# Patient Record
Sex: FEMALE | Race: WHITE | NOT HISPANIC OR LATINO
[De-identification: names, ages, dates, MRNs, and addresses within clinical notes are randomized per-mention and may not be internally consistent; named-entity substitution may affect disease eponyms.]

---
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
## 2022-01-03 NOTE — HISTORY OF PRESENT ILLNESS
[FreeTextEntry1] : This is the first outpatient cardiac visit for Ms. Olivera.\par \par 20 yo woman \par \par h/o anxiety / depression\par lipids 2020: tchol 203, trig 80, HDL 56,  mg/dL\par A1c 2020: 5.2%

## 2022-01-06 ENCOUNTER — APPOINTMENT (OUTPATIENT)
Dept: CARDIOLOGY | Facility: CLINIC | Age: 22
End: 2022-01-06

## 2022-01-17 ENCOUNTER — TRANSCRIPTION ENCOUNTER (OUTPATIENT)
Age: 22
End: 2022-01-17

## 2023-02-22 ENCOUNTER — EMERGENCY (EMERGENCY)
Facility: HOSPITAL | Age: 23
LOS: 1 days | Discharge: HOME | End: 2023-02-22
Attending: EMERGENCY MEDICINE | Admitting: EMERGENCY MEDICINE
Payer: COMMERCIAL

## 2023-02-22 VITALS
OXYGEN SATURATION: 100 % | SYSTOLIC BLOOD PRESSURE: 119 MMHG | DIASTOLIC BLOOD PRESSURE: 75 MMHG | RESPIRATION RATE: 16 BRPM | HEART RATE: 110 BPM | TEMPERATURE: 98 F

## 2023-02-22 PROCEDURE — 99284 EMERGENCY DEPT VISIT MOD MDM: CPT

## 2023-02-22 NOTE — ED ADULT NURSE NOTE - OBJECTIVE STATEMENT
Break RN: Pt presents to the ED via EMS from [facility] for SI without a plan. Pt says she feels stressed with schoolwork and is having a difficult time with friends. Pt has a Hx of depression and takes medication at home. Pt admits to cutting her left FA and RU thigh out of frustration. Pt denies homicidal ideation, denies auditory or visual hallucinations. Pt states she feels anxious. Pt denies any pain, no pertinent medical issues. Break RN: Pt presents to the ED via EMS from [facility] for SI without a plan. Pt says she feels stressed with schoolwork and is having a difficult time with friends. Pt has a Hx of depression and takes medication at home. Pt admits to cutting her left FA and RU thigh out of frustration last week. Pt denies homicidal ideation, denies auditory or visual hallucinations. Pt states she feels anxious. Pt denies any pain, no pertinent medical issues.

## 2023-02-22 NOTE — ED ADULT TRIAGE NOTE - CHIEF COMPLAINT QUOTE
Pt c/o inc depression and SI since yesterday. pt is calm and cooperative and is a student at  [employer]. Pt states she has been having inc stress from school and having issues with friends. pt denies any active plan. pt has PMH of depression on zoloft and compliant with medications. denies any medical complaints. Pt denies HI.  Pt denies visual or auditory hallucinations or other complaints. No complaints of chest pain, headache, nausea, dizziness, vomiting  SOB, fever, chills verbalized..

## 2023-02-23 VITALS
OXYGEN SATURATION: 100 % | HEART RATE: 96 BPM | RESPIRATION RATE: 16 BRPM | TEMPERATURE: 99 F | SYSTOLIC BLOOD PRESSURE: 116 MMHG | DIASTOLIC BLOOD PRESSURE: 76 MMHG

## 2023-02-23 DIAGNOSIS — F33.1 MAJOR DEPRESSIVE DISORDER, RECURRENT, MODERATE: ICD-10-CM

## 2023-02-23 NOTE — ED BEHAVIORAL HEALTH NOTE - BEHAVIORAL HEALTH NOTE
A  contacted  [employer] at [phone] to arrange transportation. SW spoke to Mr. Kevin, who stated that he would arrange a taxi for the student to return to [place]. SW will receive a call when the taxi is in front of the ER.

## 2023-02-23 NOTE — ED PROVIDER NOTE - OBJECTIVE STATEMENT
This is a 22-year-old female past medical history of anxiety and depression with complaint of increased depression and suicidal ideations. 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 Reports stressors as supposed to graduate this semester, but unfortunately she will not be able to do so because she is missing couple of credits. Tonight she called her friend and expressed she does not see any other way out ,she is so disappointed, and just wants to hurt herself. Endorses engaged in self-injurious behaviors about 2 days ago some healed scab, superficial linear cuts to the left forearm and one linear superficial healed cut to right upper thigh, used a razor, utd with tetanus.  She endorses she was frustrated and wanted to have some relief, she did not wanted to die.

## 2023-02-23 NOTE — ED PROVIDER NOTE - PATIENT PORTAL LINK FT
You can access the FollowMyHealth Patient Portal offered by [facility] by registering at the following website: [email or web address]. By joining [facility]’s FollowMyHealth portal, you will also be able to view your health information using other applications (apps) compatible with our system.

## 2023-02-23 NOTE — ED BEHAVIORAL HEALTH ASSESSMENT NOTE - DESCRIPTION
In the ED, patient has been calm, cooperative, and in good behavioral control. None Domiciled at  [employer] in dorms with 2 roommates

## 2023-02-23 NOTE — ED BEHAVIORAL HEALTH ASSESSMENT NOTE - RISK ASSESSMENT
Patient is a low acute risk of harm to self or others.  Risk factors: MDD, social stressors, NSSIB  Protective factors: in outpatient tx, sobriety, social supports

## 2023-02-23 NOTE — ED ADULT NURSE REASSESSMENT NOTE - NS ED NURSE REASSESS COMMENT FT1
Pt denies any complaints, denies any SI/HI, is calm and cooperative and appears in no obv distress. Cab outside to transport pt back to school.

## 2023-02-23 NOTE — ED BEHAVIORAL HEALTH ASSESSMENT NOTE - SUMMARY
Patient is a 22 year-old female, non-caregiver, current senior at  [employer] and domiciled in dorms, no reported PMH, PPH of MDD, in outpatient treatment with Dr. Flor Huber, recent NSSIB via cutting, no past suicide attempts or psychiatric hospitalizations, BIBA s/p a friend activating 911 after patient disclosed worsening depressive symptoms and passive SI without plan in the context of multiple psychosocial stressors. On assessment, patient's symptoms consistent with a depressive episode vs. Cluster B personality pathology. Acute risk factors mitigated by the absence of current SI, patient's ability to contract for safety, and a close follow-up appointment with outpatient psychiatrist. Outpatient provider will call patient for follow-up appointment tomorrow. At this time, patient does not present as an increased risk of harm to self or others and there are no psychiatric contraindications to discharge.

## 2023-02-23 NOTE — ED PROVIDER NOTE - NSFOLLOWUPINSTRUCTIONS_ED_ALL_ED_FT
You were seen in the Emergency Room for suicidal thoughts and anxiety.    You were seen by psychiatry and deemed to be stable for discharge home with mental health resources.     Please follow up at our walk-in [facility] to talk to someone about your mental health and see a mental health professional.     [facility]:  [phone] [place] [address]  Hours- Monday-Friday 9am-3pm (no appointment needed)  Phone- [phone]    If you are feeling suicidal or homicidal, please call 911 to get immediate help

## 2023-02-23 NOTE — ED BEHAVIORAL HEALTH ASSESSMENT NOTE - HPI (INCLUDE ILLNESS QUALITY, SEVERITY, DURATION, TIMING, CONTEXT, MODIFYING FACTORS, ASSOCIATED SIGNS AND SYMPTOMS)
Patient is a 22 year-old female, non-caregiver, current senior at  [employer] and domiciled in dorms, no reported PMH, PPH of MDD, in outpatient treatment with Dr. Flor Huber, recent NSSIB via cutting, no past suicide attempts or psychiatric hospitalizations, BIBA s/p a friend activating 911 after patient disclosed worsening depressive symptoms and passive SI without plan in the context of multiple psychosocial stressors.    Patient seen by resident and attending at bedside. On exam, patient is cooperative, tearful, and in good behavioral control. Pt states that she was speaking to her long-distance friend on the phone tonight and mentioned that she was having passive thoughts of wanting to die and feeling depressed. Patient's friend called the school to do a welfare check and pt was brought to ED. Patient reports that for the last three weeks, she has been experiencing worsening depressed mood, anhedonia, and feelings of hopelessness. PT also states that she has experienced passive SI without a plan for the last week. Patient reports that she never intended to follow through with her suicidal thoughts and did not engage in any preparatory acts. Pt references multiple recent social stressors: an issue with her academic credits resulting in her not being able to graduate on time and the patient is not getting along with her two roommates. Pt also reports that she engaged in NSSIB for the first time this week via cutting her arm and thigh superficially with a razor. Pt states that the cutting behavior was related to stress relief; not suicidal thoughts.    Patient denies current active or passive SI/HI. Endorses history of anxiety and states that most of her current anxiety is related to her current stressors. Denies history of kelli (no grandiosity, increased goal-directed activity, decreased need for sleep, or racing thoughts). No history of psychosis (no delusions, paranoia, or AVH). Rarely drinks EtOH. Denies tobacco or illicit drug use. Pt states that despite her depressive symptoms, she has noticed no change in her sleep, concentration, and energy levels and has been attending classes, keeping up with hygiene, and working part-time. She is currently in treatment with Dr. Flor Huber and takes Zoloft 100 mg QD and Alprazolam 0.5 mg qhs PRN, last appointment was 4 weeks ago. Denies past attempts or psychiatric hospitalizations.     Spoke with Alejandrina, patient's friend who activated 911 (). Pt's friend reports that she was concerned about her friend because they were speaking on the phone and the pt sounded overwhelmed and hopeless. Pt's friend contacted the school for a welfare check and was not intending to send the patient to the ED. Pt's friend has no concerns about her returning to the dorms with outpatient followup. Reports that patient has been more stressed out recently. Reports patient is future-oriented and they have plans to celebrate pt's friends birthday in two months.    Spoke with patient's roommate (Jairon [phone]). Pt's roommate states that they are no longer close following a disagreement and she does not know the details about what happened tonight. However, pt's roommate states she has noticed the patient being more tearful and sad this month, but does also reference the multiple stressors the patient is going through. States that her friend who activated 911 is an online friend who she has never met in person. No current safety concerns (but also states that she does not know details of patient's life).    Spoke to patient's psychiatrist (Dr. Huber [phone]). Pt's psychiatrist reports patient having a history of passive SI and depressed mood but has been stable. Pt's psychiatrist has no safety concerns and will call patient tomorrow for follow-up appointment.

## 2023-02-23 NOTE — ED BEHAVIORAL HEALTH ASSESSMENT NOTE - OTHER
Patient's friend Alejandrina called the school to bring patient to ED Has 2 roommates Domiciled in  [employer]orms

## 2023-02-23 NOTE — ED BEHAVIORAL HEALTH ASSESSMENT NOTE - NSBHATTESTCOMMENTATTENDFT_PSY_A_CORE
Patient is a 22 year-old female, non-caregiver, current senior at  [employer] and domiciled in dorms, no reported PMH, PPH of MDD, in outpatient treatment with Dr. Flor Huber, recent NSSIB via cutting, no past suicide attempts or psychiatric hospitalizations, BIBA s/p a friend activating 911 after patient disclosed worsening depressive symptoms and passive SI without plan in the context of multiple psychosocial stressors.    Patient presents with depressed mood and recent passive si with first time nssib in the setting fo stressors. She denies the self injurious behaviors were sa , and reports it was rather to relieve pain . Pt denies having at any point any plan or intent to end her life. She is able to cute protective factors, engage in safety planning and denies any impairment in functioning. Patient at this time does not meet criteria for involuntary admission due to lack of imminent risk.

## 2023-02-23 NOTE — ED PROVIDER NOTE - CLINICAL SUMMARY MEDICAL DECISION MAKING FREE TEXT BOX
This is a 22-year-old female past medical history of anxiety and depression with complaint of increased depression and suicidal ideations. 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 Reports stressors as supposed to graduate this semester, but unfortunately she will not be able to do so because she is missing couple of credits. Tonight she called her friend and expressed she does not see any other way out ,she is so disappointed, and just wants to hurt herself. Endorses engaged in self-injurious behaviors about 2 days ago some healed scab, superficial linear cuts to the left forearm and one linear superficial healed cut to right upper thigh, used a razor, utd with tetanus.  She endorses she was frustrated and wanted to have some relief, she did not wanted to die.  Psychiatric consult - recommendation out patient follow up.

## 2023-02-24 NOTE — ED BEHAVIORAL HEALTH NOTE - BEHAVIORAL HEALTH NOTE
High risk:  writer called patient [phone] who states that she denies any SI.  She states that she has an apt with her psychiatrist dr. dow @ 2pm.

## 2023-05-18 PROBLEM — F32.9 MAJOR DEPRESSIVE DISORDER, SINGLE EPISODE, UNSPECIFIED: Chronic | Status: ACTIVE | Noted: 2023-02-23

## 2023-05-18 PROBLEM — F41.9 ANXIETY DISORDER, UNSPECIFIED: Chronic | Status: ACTIVE | Noted: 2023-02-23

## 2023-09-22 ENCOUNTER — APPOINTMENT (OUTPATIENT)
Dept: INTERNAL MEDICINE | Facility: CLINIC | Age: 23
End: 2023-09-22
Payer: COMMERCIAL

## 2023-09-22 VITALS
HEART RATE: 71 BPM | OXYGEN SATURATION: 97 % | BODY MASS INDEX: 20.14 KG/M2 | DIASTOLIC BLOOD PRESSURE: 64 MMHG | HEIGHT: 64 IN | RESPIRATION RATE: 15 BRPM | WEIGHT: 118 LBS | SYSTOLIC BLOOD PRESSURE: 98 MMHG

## 2023-09-22 DIAGNOSIS — Z23 ENCOUNTER FOR IMMUNIZATION: ICD-10-CM

## 2023-09-22 DIAGNOSIS — Z00.00 ENCOUNTER FOR GENERAL ADULT MEDICAL EXAMINATION W/OUT ABNORMAL FINDINGS: ICD-10-CM

## 2023-09-22 PROCEDURE — 90686 IIV4 VACC NO PRSV 0.5 ML IM: CPT

## 2023-09-22 PROCEDURE — 99385 PREV VISIT NEW AGE 18-39: CPT | Mod: 25

## 2023-09-22 PROCEDURE — 36415 COLL VENOUS BLD VENIPUNCTURE: CPT

## 2023-09-22 PROCEDURE — G0008: CPT

## 2023-09-28 LAB
25(OH)D3 SERPL-MCNC: 26.3 NG/ML
ALBUMIN SERPL ELPH-MCNC: 4.9 G/DL
ALP BLD-CCNC: 56 U/L
ALT SERPL-CCNC: 7 U/L
ANION GAP SERPL CALC-SCNC: 11 MMOL/L
AST SERPL-CCNC: 13 U/L
BILIRUB SERPL-MCNC: 0.5 MG/DL
BUN SERPL-MCNC: 8 MG/DL
CALCIUM SERPL-MCNC: 10.2 MG/DL
CHLORIDE SERPL-SCNC: 103 MMOL/L
CHOLEST SERPL-MCNC: 224 MG/DL
CO2 SERPL-SCNC: 24 MMOL/L
CREAT SERPL-MCNC: 0.55 MG/DL
EGFR: 133 ML/MIN/1.73M2
ESTIMATED AVERAGE GLUCOSE: 111 MG/DL
GLUCOSE SERPL-MCNC: 92 MG/DL
HBA1C MFR BLD HPLC: 5.5 %
HCT VFR BLD CALC: 40.6 %
HDLC SERPL-MCNC: 61 MG/DL
HGB BLD-MCNC: 12.4 G/DL
LDLC SERPL CALC-MCNC: 147 MG/DL
MCHC RBC-ENTMCNC: 27.1 PG
MCHC RBC-ENTMCNC: 30.5 GM/DL
MCV RBC AUTO: 88.8 FL
NONHDLC SERPL-MCNC: 163 MG/DL
PLATELET # BLD AUTO: 207 K/UL
POTASSIUM SERPL-SCNC: 5.1 MMOL/L
PROT SERPL-MCNC: 7.2 G/DL
RBC # BLD: 4.57 M/UL
RBC # FLD: 13.6 %
SODIUM SERPL-SCNC: 139 MMOL/L
TRIGL SERPL-MCNC: 93 MG/DL
TSH SERPL-ACNC: 1.13 UIU/ML
VIT B12 SERPL-MCNC: 456 PG/ML
WBC # FLD AUTO: 6.47 K/UL